# Patient Record
Sex: FEMALE | Race: WHITE | Employment: FULL TIME | URBAN - METROPOLITAN AREA
[De-identification: names, ages, dates, MRNs, and addresses within clinical notes are randomized per-mention and may not be internally consistent; named-entity substitution may affect disease eponyms.]

---

## 2017-07-11 ENCOUNTER — OFFICE VISIT (OUTPATIENT)
Dept: URGENT CARE | Facility: URGENT CARE | Age: 52
End: 2017-07-11
Payer: COMMERCIAL

## 2017-07-11 VITALS
DIASTOLIC BLOOD PRESSURE: 79 MMHG | OXYGEN SATURATION: 96 % | SYSTOLIC BLOOD PRESSURE: 126 MMHG | HEART RATE: 93 BPM | TEMPERATURE: 98.3 F | WEIGHT: 277 LBS

## 2017-07-11 DIAGNOSIS — H57.12 EYE PAIN, LEFT: Primary | ICD-10-CM

## 2017-07-11 PROCEDURE — 99214 OFFICE O/P EST MOD 30 MIN: CPT | Performed by: PHYSICIAN ASSISTANT

## 2017-07-11 NOTE — MR AVS SNAPSHOT
"              After Visit Summary   2017    Yaquelin Inman    MRN: [id number]           Patient Information     Date Of Birth          1965        Visit Information        Provider Department      2017 5:20 PM Jeimy Lipscomb PA-C [facility]        Today's Diagnoses     Eye pain, left    -  1       Follow-ups after your visit        Follow-up notes from your care team     Return if symptoms worsen or fail to improve.      Who to contact     If you have questions or need follow up information about today's clinic visit or your schedule please contact [facility] directly at [phone].  Normal or non-critical lab and imaging results will be communicated to you by MyChart, letter or phone within 4 business days after the clinic has received the results. If you do not hear from us within 7 days, please contact the clinic through [employer] or phone. If you have a critical or abnormal lab result, we will notify you by phone as soon as possible.  Submit refill requests through [employer] or call your pharmacy and they will forward the refill request to us. Please allow 3 business days for your refill to be completed.          Additional Information About Your Visit        MyChart Information     [employer] lets you send messages to your doctor, view your test results, renew your prescriptions, schedule appointments and more. To sign up, go to [email or web address] . Click on \"Log in\" on the left side of the screen, which will take you to the Welcome page. Then click on \"Sign up Now\" on the right side of the page.     You will be asked to enter the access code listed below, as well as some personal information. Please follow the directions to create your username and password.     Your access code is: [email or web address]  Expires: 10/9/2017 10:40 PM     Your access code will  in 90 days. If you need help or a new code, please call your [facility] or [phone].        Care " EveryWhere ID     This is your Care EveryWhere ID. This could be used by other organizations to access your [facility] medical records  [id number]        Your Vitals Were     Pulse Temperature Pulse Oximetry             93 98.3  F (36.8  C) (Oral) 96%          Blood Pressure from Last 3 Encounters:   07/11/17 126/79   09/29/11 162/96   03/27/10 132/86    Weight from Last 3 Encounters:   07/11/17 277 lb (125.6 kg)              Today, you had the following     No orders found for display       Primary Care Provider    [facility] Other Clinic                Equal Access to Services     [facility]: Hadii aad ku hadasho Soomaali, waaxda luqadaha, qaybta kaalmada adeegyaradha, latha medrano . So [facility] [phone].    ATENCIÓN: Si habla español, tiene a brown disposición servicios gratuitos de asistencia lingüística. Llame al [phone].    We comply with applicable federal civil rights laws and [place] laws. We do not discriminate on the basis of race, color, national origin, age, disability sex, sexual orientation or gender identity.            Thank you!     Thank you for [facility]  [facility]. Our goal is always to provide you with excellent care. Hearing back from our patients is one way we can continue to improve our services. Please take a few minutes to complete the written survey that you may receive in the mail after your visit with us. Thank you!             Your Updated Medication List - Protect others around you: Learn how to safely use, store and throw away your medicines at www.disposemymeds.org.          This list is accurate as of: 7/11/17 10:40 PM.  Always use your most recent med list.                   Brand Name Dispense Instructions for use Diagnosis    ALBUTEROL IN      None Entered        FLONASE 50 MCG/DOSE nasal spray      None Entered        other medical supplies      otc allergy meds        PROTONIX 40 MG EC tablet   Generic drug:  pantoprazole       1 TABLET DAILY        simvastatin 40 MG tablet    ZOCOR     Take 40 mg by mouth At Bedtime.        SUDAFED PO      None Entered        SYNTHROID 137 MCG tablet   Generic drug:  levothyroxine      Take 137 mcg by mouth daily.

## 2017-07-11 NOTE — NURSING NOTE
Chief Complaint   Patient presents with     Eye Problem       Initial /79  Pulse 93  Temp 98.3  F (36.8  C) (Oral)  Wt 277 lb (125.6 kg)  SpO2 96% There is no height or weight on file to calculate BMI.  Medication Reconciliation: complete  Federica Holt, CMA

## 2017-07-11 NOTE — Clinical Note
Agree with assessment and plan. Great job Jeimy See. Worry about glaucoma as well with eye pain and red eye. Just in case you're ever not sure though you can always call the eye specialist on call. Unfortunately we are very limited here without a slit lamp and unable to check eye pressures.

## 2019-01-12 ENCOUNTER — OFFICE VISIT (OUTPATIENT)
Dept: URGENT CARE | Facility: URGENT CARE | Age: 54
End: 2019-01-12
Payer: COMMERCIAL

## 2019-01-12 VITALS
DIASTOLIC BLOOD PRESSURE: 79 MMHG | TEMPERATURE: 97.6 F | HEART RATE: 77 BPM | SYSTOLIC BLOOD PRESSURE: 130 MMHG | WEIGHT: 287.2 LBS | OXYGEN SATURATION: 97 %

## 2019-01-12 DIAGNOSIS — M25.561 RIGHT KNEE PAIN, UNSPECIFIED CHRONICITY: Primary | ICD-10-CM

## 2019-01-12 PROCEDURE — 99213 OFFICE O/P EST LOW 20 MIN: CPT | Performed by: INTERNAL MEDICINE

## 2019-01-12 RX ORDER — ROSUVASTATIN CALCIUM 10 MG/1
10 TABLET, COATED ORAL
COMMUNITY
Start: 2018-10-22

## 2019-01-12 RX ORDER — MONTELUKAST SODIUM 10 MG/1
10 TABLET ORAL
COMMUNITY
Start: 2018-10-22

## 2019-01-12 NOTE — PROGRESS NOTES
SUBJECTIVE:  Yaquelin Inman is an 53 year old female who presents for knee pain in right knee.  Has had trouble with her knee for years intermittently.  Over the past week has had a lot more pain in the knee.  Pain is constant now but will ease or worsen.  No recent injuries or falls.  No unusual activities or more aggressive usual activities. Hurts worst with going up stairs.  Hurts to walk.  Has put some arthritis cream on it which helps for a little while.  Ibuprofen not help much.  Had one vicodin left from prior kidney stones, which helped her sleep and dulled the pain a little.  No back pain recently.      PMH:  Patient Active Problem List   Diagnosis     ELGIN LUMBAGO     ELGIN LUMBOSACRAL NEURITIS NOS     Social History     Socioeconomic History     Marital status: Single     Spouse name: Not on file     Number of children: Not on file     Years of education: Not on file     Highest education level: Not on file   Social Needs     Financial resource strain: Not on file     Food insecurity - worry: Not on file     Food insecurity - inability: Not on file     Transportation needs - medical: Not on file     Transportation needs - non-medical: Not on file   Occupational History     Not on file   Tobacco Use     Smoking status: Current Every Day Smoker     Packs/day: 1.00     Types: Cigarettes   Substance and Sexual Activity     Alcohol use: No     Drug use: No     Sexual activity: Not on file   Other Topics Concern     Parent/sibling w/ CABG, MI or angioplasty before 65F 55M? Not Asked   Social History Narrative     Not on file     No family history on file.    ALLERGIES:  Sulfa drugs; Aleve [naproxen sodium]; Neosporin; Percocet [oxycodone-acetaminophen]; and Ultracet    Current Outpatient Medications   Medication     ALBUTEROL IN     levothyroxine (SYNTHROID) 137 MCG tablet     montelukast (SINGULAIR) 10 MG tablet     OTHER MEDICAL SUPPLIES     PROTONIX 40 MG OR TBEC     rosuvastatin (CRESTOR) 10 MG tablet      SUDAFED OR     No current facility-administered medications for this visit.          ROS:  ROS is done and is negative for general/constitutional, eye, ENT, Respiratory, cardiovascular, GI, , Skin, musculoskeletal except as noted elsewhere.  All other review of systems negative except as noted elsewhere.      OBJECTIVE:  /79   Pulse 77   Temp 97.6  F (36.4  C) (Oral)   Wt 130.3 kg (287 lb 3.2 oz)   SpO2 97%   GENERAL APPEARANCE: Alert, in no acute distress  EYES: normal  NOSE:normal  OROPHARYNX:normal  NECK:No adenopathy,masses or thyromegaly  RESP: normal and clear to auscultation  CV:regular rate and rhythm and no murmurs, clicks, or gallops  ABDOMEN: Abdomen soft, non-tender. BS normal. No masses, organomegaly  SKIN: no ulcers, lesions or rash  MUSCULOSKELETAL:right knee- mild tenderness diffusely with most tender along medial joint line.  No edema or erythema or bruising.  Normal rom with no significant crepitus or pain.  No joint instability noted.      RESULTS  .  No results found for this or any previous visit (from the past 48 hour(s)).    ASSESSMENT/PLAN:    ASSESSMENT / PLAN:  (M25.561) Right knee pain, unspecified chronicity  (primary encounter diagnosis)  Comment: possibly osteoarthritis as pt has been told has some of that.  Also consider meniscal injury as has pain along medial joint line.  Plan: ORTHO  REFERRAL        Pt provided with crutches to use prn.  F/u with ortho within a few days for further eval and treatment.  Discussed with pt who agrees with plan. I reviewed supportive care including ibuprofen 800 mg tid prn with food and icing of the area, otc meds to use if needed, expected course, and signs of concern.  Follow up with ortho in 2-3 day(s) or if worsens in any way.  Reviewed red flag symptoms and is to go to the ER if experiences any of these.      See [facility] for orders, medications, letters, patient instructions    Chantell Gill M.D.

## 2021-08-18 ENCOUNTER — OFFICE VISIT (OUTPATIENT)
Dept: URGENT CARE | Facility: URGENT CARE | Age: 56
End: 2021-08-18
Payer: COMMERCIAL

## 2021-08-18 VITALS
TEMPERATURE: 97.7 F | DIASTOLIC BLOOD PRESSURE: 81 MMHG | OXYGEN SATURATION: 96 % | SYSTOLIC BLOOD PRESSURE: 130 MMHG | RESPIRATION RATE: 14 BRPM | HEART RATE: 76 BPM | WEIGHT: 293 LBS

## 2021-08-18 DIAGNOSIS — H01.00B BLEPHARITIS OF BOTH UPPER AND LOWER EYELID OF LEFT EYE, UNSPECIFIED TYPE: Primary | ICD-10-CM

## 2021-08-18 DIAGNOSIS — N76.0 VAGINITIS AND VULVOVAGINITIS: ICD-10-CM

## 2021-08-18 DIAGNOSIS — L03.811 CELLULITIS OF MULTIPLE SITES OF SCALP AND NECK: ICD-10-CM

## 2021-08-18 DIAGNOSIS — L03.221 CELLULITIS OF MULTIPLE SITES OF SCALP AND NECK: ICD-10-CM

## 2021-08-18 DIAGNOSIS — L29.9 ITCHING: ICD-10-CM

## 2021-08-18 PROCEDURE — 99213 OFFICE O/P EST LOW 20 MIN: CPT | Performed by: FAMILY MEDICINE

## 2021-08-18 RX ORDER — HYDROXYZINE HYDROCHLORIDE 25 MG/1
25 TABLET, FILM COATED ORAL 3 TIMES DAILY PRN
Qty: 30 TABLET | Refills: 0 | Status: SHIPPED | OUTPATIENT
Start: 2021-08-18

## 2021-08-18 RX ORDER — FLUCONAZOLE 150 MG/1
150 TABLET ORAL ONCE
Qty: 1 TABLET | Refills: 0 | Status: SHIPPED | OUTPATIENT
Start: 2021-08-18 | End: 2021-08-18

## 2021-08-18 RX ORDER — CEPHALEXIN 500 MG/1
1000 CAPSULE ORAL 2 TIMES DAILY
Qty: 56 CAPSULE | Refills: 0 | Status: SHIPPED | OUTPATIENT
Start: 2021-08-18 | End: 2021-09-01

## 2021-08-18 RX ORDER — ERYTHROMYCIN 5 MG/G
OINTMENT OPHTHALMIC
Qty: 3.5 G | Refills: 0 | Status: SHIPPED | OUTPATIENT
Start: 2021-08-18

## 2021-08-18 ASSESSMENT — PAIN SCALES - GENERAL: PAINLEVEL: MILD PAIN (2)

## 2021-08-18 NOTE — PROGRESS NOTES
Chief complaint: left eye infectoin  Skin infection scalp    Has had an eye infection in the left eye couple of years ago    Recently the past last week had some plugging in left ear now is better  Then left eyelid is started getting red and swollen itchy   Mattering in the moring when she wakes up    No blurring of vision no photophobia no eye pain no feeling of foreign body no history of eye trauma, No contact lens wearer      Scalp has been itching - patient has tendency to scratch   Has been picking at the scalp  Draining now for the past couple of weeks      Allergies   Allergen Reactions     Sulfa Drugs Anaphylaxis     Aleve [Naproxen Sodium]      Neosporin      Percocet [Oxycodone-Acetaminophen]      Ultracet        No past medical history on file.    ALBUTEROL IN, None Entered  levothyroxine (SYNTHROID) 137 MCG tablet, Take 137 mcg by mouth daily.  montelukast (SINGULAIR) 10 MG tablet, Take 10 mg by mouth  PROTONIX 40 MG OR TBEC, 1 TABLET DAILY  rosuvastatin (CRESTOR) 10 MG tablet, Take 10 mg by mouth  OTHER MEDICAL SUPPLIES, otc allergy meds  SUDAFED OR, None Entered (Patient not taking: No sig reported)    No current facility-administered medications on file prior to visit.      Social History     Tobacco Use     Smoking status: Current Every Day Smoker     Packs/day: 1.00     Types: Cigarettes     Smokeless tobacco: Never Used   Substance Use Topics     Alcohol use: No     Drug use: No       ROS:   review of systems negative except for noted above.   No thoughts of harming self or others.     OBJECTIVE:  /81   Pulse 76   Temp 97.7  F (36.5  C) (Oral)   Resp 14   Wt 133.9 kg (295 lb 3.2 oz)   SpO2 96%    General:   awake, alert, and cooperative.  NAD.   Head: Normocephalic, atraumatic.  Eyes: Conjunctiva clear,   No grossly visible conjunctival or corneal foreign body No hyphema, no hypopyon, no ciliary flush, no periorbital swelling or cellulitis or pain with extraocular muscle movement. no  erythema bilateral conjunctiva  Left upper and lower eyelid some hypertrophy and erythema of the lash glands   Neuro: Alert and oriented - normal speech.  MS: Using extremities freely  PSYCH:  Normal affect, normal speech  SKIN: erythema warmth swelling over posterior scalp  measuring approximately 2-3 1-2 cm plaques , No purulent discharge, No fluctuation. Crusted with honey colored plaques     ASSESSMENT:    ICD-10-CM    1. Blepharitis of both upper and lower eyelid of left eye, unspecified type  H01.00B erythromycin (ROMYCIN) 5 MG/GM ophthalmic ointment   2. Cellulitis of multiple sites of scalp and neck  L03.811 cephALEXin (KEFLEX) 500 MG capsule    L03.221    3. Vaginitis and vulvovaginitis  N76.0 fluconazole (DIFLUCAN) 150 MG tablet   4. Itching  L29.9 hydrOXYzine (ATARAX) 25 MG tablet       PLAN:   Prescribed with above  See AVS  Recommend eye follow up for eye concerns in 3-5 days if persist  asap if worsening  For conjunctivitis: if with any worsening symptoms, pain, photophobia, worsening redness, swelling, feeling of foreign body go to ER or see your eye doctor  Prescribed with keflex   Atarax as needed itching - warned sedating -   Sedating medications given. Aware not to drive or operate machinery while on these medications. Caution with .   Do not take with other sedating meds   Patient requested prescription for fluconazole as needed if she develops a yeast vaginitis. She says that she gets a yeast infection with antibioticsAware that Fluconazole does have side effects that were discussed But if persist, recommend being seen.   Watch for any worsening redness warmth swelling tenderness or inflammation or purulent discharge. If with any of these please come in immediately to be re-evaluated  Please come in immediately also if with any fever or chills  Adverse reactions of medications discussed.  Over the counter medications discussed.   Aware to come back in if with worsening symptoms or if  no relief despite treatment plan  Patient voiced understanding and had no further questions.     Follow up:  Primary care provider in 3-4 days  Advised about symptoms which might herald more serious problems.        Evonne Licona MD

## 2021-08-18 NOTE — PATIENT INSTRUCTIONS
Patient Education     Blepharitis    Blepharitis is an inflammation of the eyelid. It results in swelling of the eyelids, and it is often caused by a bacterial infection or a skin condition. Blepharitis is a common eye condition. There are two types. Anterior blepharitis occurs where the eyelashes are attached (outside front edge of the eyelid). Posterior blepharitis affects the inner edge of the eyelid that touches the eyeball.  In addition to swollen eyelids, blepharitis symptoms can include thick, yellow, dandruff-like scales that stick to the eyelid. There may be oily patches on the eyelid. The eyelashes may be crusted (with dandruff-like scales) when you wake up from sleeping. The irritated area may itch. The eyelids may be red. The eyes can be red and burn or sting. The eyes may tear a lot, or be dry. You can become sensitive to light or have blurred vision. Symptoms of blepharitis can cause irritability.  Blepharitis is a chronic condition and hard to cure. Even with successful treatment, recurrences are common. Good hygiene and home treatments (in the Home care section below) can improve your condition.  Causes  Causes of blepharitis may include:    Problems with the oil glands in the eyelid (meibomian glands)    Dandruff of the scalp and eyebrows (seborrheic dermatitis)    Acne rosacea (a skin condition that causes redness of the face, and other symptoms)    Eyelash mites (tiny organisms in the eyelash follicles)    Allergic reactions to cosmetics or medicines  Home care  Medicine: The healthcare provider may prescribe an antibiotic eye drops or ointment, artificial tears, and/or steroid eye drops. Follow all instructions for using these medicines. Use all medicines as directed. If you have pain, take medicine as advised by the healthcare provider.    Wash your hands carefully with soap and warm water before and after caring for your eyes.    Apply a warm compress or a warm, moist washcloth to the eyelids  for 1 minute, 2 to 3 times a day, to loosen the crust. Then, wipe away scales or crust from the eyelids.    After applying the warm compress, gently scrub the base of the eyelashes for almost 15 seconds per eyelid. To do this, close your eyes and use a moist eyelid cleansing wipe, clean washcloth, or cotton swab. Ask your healthcare provider about products (such as gentle baby shampoo) to use to help clean the eyelids.    You may be instructed to gently massage your eyelids to help unblock the eyelid glands. Follow all instructions given by the healthcare provider.    Unless told otherwise, on a regular basis, with eyes closed, clean your eyelids as directed by the healthcare provider. Blepharitis can be an ongoing problem.    Don't wear eye makeup until the inflammation goes away, or as directed by your healthcare provider.    Unless told otherwise, stop using contact lenses until you complete treatment for the condition.    Wash your hands regularly to help prevent dirt and bacteria from coming in contact with your eyelid.  Follow-up care  Follow up with your healthcare provider, or as advised. Your healthcare provider may refer you to an eye specialist (an optometrist or ophthalmologist) for further evaluation and treatment.  When to seek medical advice  Call your healthcare provider right away if any of these occur:    Increase in redness of the white part of the eye    Increase in swelling, redness, irritation, or pain of the eyelids    Eye pain    Change in vision (trouble seeing or blurring)    Drainage (pus, blood) from the eyelid    Fever of 100.4 F (38 C) or higher, or as directed by your healthcare provider  [employer] last reviewed this educational content on 3/1/2018    1813-9982 [employer]. All rights reserved. This information is not intended as a substitute for professional medical care. Always follow your healthcare professional's instructions.           Patient Education     Treating  Blepharitis: Self-Care    To treat the problem, keep your eyelids clean. Warm compresses can reduce redness and swelling, and help clean your eyelids, too. You may also need to wash the area gently with an eyelid scrub when you wake up.  To apply a warm compress:  1. Wash your hands with soap and warm water.  2. Wet a clean washcloth with warm water. Then wring it out.  3. Close your eyes and place the washcloth over your eyelids for 3 to 5 minutes. This helps loosen scales or crusts.  4. Wet the washcloth again as often as needed to keep it warm.  Repeat 2 or more times a day. Use a clean washcloth each time.  To use an eyelid scrub:  1. Wash your hands with soap and warm water.  2. Use a ready-made eyelid scrub. Or mix 3 drops of baby shampoo in 1/4 cup of warm water.  3. Dip a lint-free pad, cotton swab, or clean washcloth in the scrub.  4. Close one eye and gently scrub the base of the eyelid.  5. Rinse the lid in cool water and dry with a clean towel.  6. Repeat on your other eye.  [employer] last reviewed this educational content on 3/1/2018    [phone] [employer]. All rights reserved. This information is not intended as a substitute for professional medical care. Always follow your healthcare professional's instructions.